# Patient Record
Sex: FEMALE | Race: WHITE | NOT HISPANIC OR LATINO
[De-identification: names, ages, dates, MRNs, and addresses within clinical notes are randomized per-mention and may not be internally consistent; named-entity substitution may affect disease eponyms.]

---

## 2018-01-22 ENCOUNTER — APPOINTMENT (OUTPATIENT)
Dept: CARDIOLOGY | Facility: CLINIC | Age: 68
End: 2018-01-22

## 2018-01-31 ENCOUNTER — TRANSCRIPTION ENCOUNTER (OUTPATIENT)
Age: 68
End: 2018-01-31

## 2018-04-20 ENCOUNTER — TRANSCRIPTION ENCOUNTER (OUTPATIENT)
Age: 68
End: 2018-04-20

## 2018-06-13 ENCOUNTER — TRANSCRIPTION ENCOUNTER (OUTPATIENT)
Age: 68
End: 2018-06-13

## 2018-06-15 ENCOUNTER — TRANSCRIPTION ENCOUNTER (OUTPATIENT)
Age: 68
End: 2018-06-15

## 2018-10-26 ENCOUNTER — APPOINTMENT (OUTPATIENT)
Dept: CARDIOLOGY | Facility: CLINIC | Age: 68
End: 2018-10-26
Payer: MEDICARE

## 2018-10-26 VITALS
RESPIRATION RATE: 14 BRPM | BODY MASS INDEX: 27.14 KG/M2 | HEIGHT: 64 IN | DIASTOLIC BLOOD PRESSURE: 80 MMHG | SYSTOLIC BLOOD PRESSURE: 126 MMHG | WEIGHT: 159 LBS | HEART RATE: 56 BPM

## 2018-10-26 DIAGNOSIS — Z78.9 OTHER SPECIFIED HEALTH STATUS: ICD-10-CM

## 2018-10-26 DIAGNOSIS — R07.89 OTHER CHEST PAIN: ICD-10-CM

## 2018-10-26 DIAGNOSIS — Z82.49 FAMILY HISTORY OF ISCHEMIC HEART DISEASE AND OTHER DISEASES OF THE CIRCULATORY SYSTEM: ICD-10-CM

## 2018-10-26 PROCEDURE — 99215 OFFICE O/P EST HI 40 MIN: CPT

## 2018-10-26 PROCEDURE — 93000 ELECTROCARDIOGRAM COMPLETE: CPT

## 2018-11-07 ENCOUNTER — APPOINTMENT (OUTPATIENT)
Dept: CARDIOLOGY | Facility: CLINIC | Age: 68
End: 2018-11-07
Payer: MEDICARE

## 2018-11-07 PROCEDURE — 93015 CV STRESS TEST SUPVJ I&R: CPT

## 2018-12-03 ENCOUNTER — APPOINTMENT (OUTPATIENT)
Dept: CARDIOLOGY | Facility: CLINIC | Age: 68
End: 2018-12-03
Payer: MEDICARE

## 2018-12-03 PROCEDURE — 93306 TTE W/DOPPLER COMPLETE: CPT

## 2019-01-10 ENCOUNTER — APPOINTMENT (OUTPATIENT)
Dept: CARDIOLOGY | Facility: CLINIC | Age: 69
End: 2019-01-10
Payer: MEDICARE

## 2019-01-10 VITALS
HEART RATE: 56 BPM | BODY MASS INDEX: 26.8 KG/M2 | RESPIRATION RATE: 14 BRPM | WEIGHT: 157 LBS | DIASTOLIC BLOOD PRESSURE: 80 MMHG | HEIGHT: 64 IN | SYSTOLIC BLOOD PRESSURE: 128 MMHG

## 2019-01-10 DIAGNOSIS — R07.89 OTHER CHEST PAIN: ICD-10-CM

## 2019-01-10 PROCEDURE — 99214 OFFICE O/P EST MOD 30 MIN: CPT

## 2019-01-10 PROCEDURE — 93000 ELECTROCARDIOGRAM COMPLETE: CPT

## 2019-01-11 NOTE — ASSESSMENT
[FreeTextEntry1] : 1.  EKG today reveals sinus bradycardia at 56 bpm.  Normal intervals.  No evidence of ischemia.  \par 2.  Hypertension:  Blood pressure under control at this time on current medications.  A low-salt diet is advised. \par 3.  Hyperlipidemia:  Recent bloodwork through her PCP’s office revealed a total cholesterol of approximately 260-270.  Will augment Crestor from 5 to 10 mg daily.  Fasting lipid profile in 6-8 weeks with an office visit thereafter. \par 4.  Chest pain:  Patient recently underwent both echocardiography and an exercise stress test.  Echo revealed normal left ventricular chamber dimensions and wall motion with preserved ejection fraction estimated at approximately 60-65%.  The left atrium and right-sided chambers revealed normal dimensions and function.  The aortic root revealed a normal diameter.  No significant valvular abnormalities, intracardiac masses, or pericardial effusions. \par 5.  Exercise stress testing was performed utilizing a Christiano protocol.  Mrs. Sharma exercised for approximately 7 minutes and obtained 92% of her predicted maximal heart rate.  At a peak heart rate of 142 bpm, there were no significant EKG changes noted to suggest ischemia.  The patient did not experience chest pain or arrhythmia with exercise.  \par \par Given the above, the patient is advised to follow up with her PCP regarding other potential etiologies for her presenting symptoms.  She does admit to left shoulder issue which may be at least in part her problem.  She is advised to exercise on a regular basis and in fact states she walks between 5 and 10 miles on a regular basis with her walking club.   \par

## 2019-01-11 NOTE — HISTORY OF PRESENT ILLNESS
[FreeTextEntry1] : At this time, the patient denies significant exertional chest pain, shortness of breath, palpitations, lightheadedness, or syncope.  She recently underwent bloodwork through her PCP’s office and states that her total cholesterol was approximately 260-270.

## 2019-01-11 NOTE — REASON FOR VISIT
[FreeTextEntry1] : Mrs. Sharma presents today for follow up evaluation of recent bouts of chest discomfort with associated left arm paresthesias.  She underwent an echocardiogram as well as an exercise stress test for evaluation of her complaints.  \par   \par

## 2019-03-28 ENCOUNTER — TRANSCRIPTION ENCOUNTER (OUTPATIENT)
Age: 69
End: 2019-03-28

## 2019-04-12 ENCOUNTER — APPOINTMENT (OUTPATIENT)
Dept: CARDIOLOGY | Facility: CLINIC | Age: 69
End: 2019-04-12
Payer: MEDICARE

## 2019-04-12 ENCOUNTER — NON-APPOINTMENT (OUTPATIENT)
Age: 69
End: 2019-04-12

## 2019-04-12 VITALS
BODY MASS INDEX: 26.98 KG/M2 | HEIGHT: 64 IN | SYSTOLIC BLOOD PRESSURE: 132 MMHG | WEIGHT: 158 LBS | DIASTOLIC BLOOD PRESSURE: 82 MMHG | HEART RATE: 59 BPM | RESPIRATION RATE: 14 BRPM

## 2019-04-12 PROCEDURE — 93000 ELECTROCARDIOGRAM COMPLETE: CPT

## 2019-04-12 PROCEDURE — 99214 OFFICE O/P EST MOD 30 MIN: CPT

## 2019-04-12 RX ORDER — LISINOPRIL 20 MG/1
20 TABLET ORAL DAILY
Qty: 90 | Refills: 0 | Status: DISCONTINUED | COMMUNITY
End: 2019-04-12

## 2019-04-19 NOTE — REASON FOR VISIT
[FreeTextEntry1] : Mrs. Sharma is a delightful 68-year-old white female with a past medical history significant for hypertension as well as hyperlipidemia who presents for follow up evaluation.

## 2019-04-19 NOTE — PHYSICAL EXAM
[General Appearance - Well Developed] : well developed [General Appearance - Well Nourished] : well nourished [General Appearance - In No Acute Distress] : no acute distress [Normal Conjunctiva] : the conjunctiva exhibited no abnormalities [Normal Oral Mucosa] : normal oral mucosa [Auscultation Breath Sounds / Voice Sounds] : lungs were clear to auscultation bilaterally [Heart Rate And Rhythm] : heart rate and rhythm were normal [Heart Sounds] : normal S1 and S2 [Abnormal Walk] : normal gait [Bowel Sounds] : normal bowel sounds [Skin Color & Pigmentation] : normal skin color and pigmentation [Oriented To Time, Place, And Person] : oriented to person, place, and time [Skin Turgor] : normal skin turgor [Affect] : the affect was normal [Mood] : the mood was normal [FreeTextEntry1] : No edema

## 2019-04-19 NOTE — HISTORY OF PRESENT ILLNESS
[FreeTextEntry1] : From a cardiac standpoint, Mrs. Sharma denies exertional chest pain, shortness of breath, palpitations, lightheadedness, or syncope.  She states that she has a daily cough which is precipitated by the ingestion of Lisinopril.

## 2019-04-19 NOTE — ASSESSMENT
[FreeTextEntry1] : 1.  EKG today demonstrates normal sinus rhythm at 59 bpm.  Normal intervals.  No evidence of ischemia.  \par 2.  Cough:  Suspect Lisinopril as a potential culprit.  Will discontinue Lisinopril at this time.  Amlodipine 5 mg daily recommended.  Patient advised on a low-salt diet.  Blood pressure check in approximately 6-8 weeks.\par 3.  Hypercholesterolemia:  Patient advised on a strict low-fat / low-cholesterol diet and periodic lipid profiles through her PCP’s office.     \par

## 2019-05-09 ENCOUNTER — APPOINTMENT (OUTPATIENT)
Dept: CARDIOLOGY | Facility: CLINIC | Age: 69
End: 2019-05-09

## 2019-07-09 ENCOUNTER — APPOINTMENT (OUTPATIENT)
Dept: CARDIOLOGY | Facility: CLINIC | Age: 69
End: 2019-07-09
Payer: MEDICARE

## 2019-07-09 ENCOUNTER — NON-APPOINTMENT (OUTPATIENT)
Age: 69
End: 2019-07-09

## 2019-07-09 VITALS
WEIGHT: 155 LBS | HEIGHT: 64 IN | DIASTOLIC BLOOD PRESSURE: 84 MMHG | RESPIRATION RATE: 14 BRPM | HEART RATE: 56 BPM | SYSTOLIC BLOOD PRESSURE: 142 MMHG | BODY MASS INDEX: 26.46 KG/M2

## 2019-07-09 DIAGNOSIS — R42 DIZZINESS AND GIDDINESS: ICD-10-CM

## 2019-07-09 PROCEDURE — 93000 ELECTROCARDIOGRAM COMPLETE: CPT

## 2019-07-09 PROCEDURE — 99215 OFFICE O/P EST HI 40 MIN: CPT

## 2019-07-09 RX ORDER — AMLODIPINE BESYLATE 5 MG/1
5 TABLET ORAL DAILY
Qty: 90 | Refills: 3 | Status: DISCONTINUED | COMMUNITY
Start: 2019-04-12 | End: 2019-07-09

## 2019-07-09 NOTE — PHYSICAL EXAM
[Normal Appearance] : normal appearance [General Appearance - Well Developed] : well developed [General Appearance - Well Nourished] : well nourished [Normal Conjunctiva] : the conjunctiva exhibited no abnormalities [Normal Oral Mucosa] : normal oral mucosa [General Appearance - In No Acute Distress] : no acute distress [Normal Oropharynx] : normal oropharynx [Normal Jugular Venous A Waves Present] : normal jugular venous A waves present [Normal Jugular Venous V Waves Present] : normal jugular venous V waves present [Respiration, Rhythm And Depth] : normal respiratory rhythm and effort [No Jugular Venous Champion A Waves] : no jugular venous champion A waves [] : no respiratory distress [Heart Sounds] : normal S1 and S2 [Auscultation Breath Sounds / Voice Sounds] : lungs were clear to auscultation bilaterally [Heart Rate And Rhythm] : heart rate and rhythm were normal [Murmurs] : no murmurs present [Bowel Sounds] : normal bowel sounds [Edema] : no peripheral edema present [Abnormal Walk] : normal gait [Nail Clubbing] : no clubbing of the fingernails [Cyanosis, Localized] : no localized cyanosis [Skin Color & Pigmentation] : normal skin color and pigmentation [Oriented To Time, Place, And Person] : oriented to person, place, and time [Impaired Insight] : insight and judgment were intact [Affect] : the affect was normal

## 2019-07-11 NOTE — HISTORY OF PRESENT ILLNESS
[FreeTextEntry1] : During the last couple of weeks prior to hospital visit, her blood pressures have been averaging in the 80s over 50s with heart rates in the 30s to 40s.  In addition, Mrs. Sharma was feeling lightheaded and dizzy almost daily since approximately June 3rd.  She subsequently stopped her Lisinopril 20 mg since being discharged from the hospital and she has felt much better, her dizziness has since resolved and her blood pressures / pulse rates have stabilized, now averaging BPs in the 120s to 130s over 80s with HRs in the 50s\par \par Patient is tolerating all other medications without negative side effects including Crestor 5 mg QHS and Synthroid 75 mcg QD.  She has since stopped taking Lisinopril 20 mg since July 4th.\par \par Most recent Exercise Stress test (November 2018):  Patient exercised 8 METS, negative EKG for ischemia.  Resting heart rate was (65 bpm) with peak heart rate of (142 bpm) reaching 92% of the maximum predicted heart rate;  (no signs of chronotropic incompetence).\par \par Most recent Transthoracic Echocardiogram (December 2018):  Normal LV function with an EF of 60 to 65%, left and right atria normal in size and structure, trace valvulopathy with mild MR, TR, AR and DC.

## 2019-07-11 NOTE — DISCUSSION/SUMMARY
[FreeTextEntry1] : 1).  Will complete a 30 day Event Monitor to assess for any tachy / tyler arrhythmias, pauses and/or AV blocks.\par \par 2).  Blood pressure and heart rate is currently controlled, will continue to hold Lisinopril 20 mg and take all other previously prescribed medications (refer above).  She is to continue to monitor BP at home and bring log and BP cuff to f/u for calibration. \par \par 3).  Follow up with PCP (Dr. Philippe) regarding routine checkups and blood work including hypothyroid management, have copy faxed to our office. \par \par 4).  Immediately go to the emergency department and/or report any untoward symptoms. \par \par 5).  Follow up with our office in 6 weeks or PRN.

## 2019-07-11 NOTE — ASSESSMENT
[FreeTextEntry1] : EKG 7/9/2019:  The EKG illustrates sinus bradycardia, rate of 56, early R wave transition V1 to V2.\par \par Most recent Blood work results from [facility] (July 4th 2019):   Hgb (12.9), Hct (37.9), Glucose (91), Creatinine (0.9), Sodium (141), Potassium (4.2), TSH (0.126), Magnesium (2.4).\par \par \par Mrs. Sharma admits to not walking through any wooded areas lately and/or noticing signs of ticks or unusual rashes.  \par \par Her Lisinopril 20 mg was replaced by Norvasc 5 mg daily on April 12th due to ACEI produced cough, she developed a constant headache and she switched herself back to Lisinopril 20 mg after approximately two weeks.\par \par

## 2019-08-20 ENCOUNTER — APPOINTMENT (OUTPATIENT)
Dept: CARDIOLOGY | Facility: CLINIC | Age: 69
End: 2019-08-20
Payer: MEDICARE

## 2019-08-20 ENCOUNTER — NON-APPOINTMENT (OUTPATIENT)
Age: 69
End: 2019-08-20

## 2019-08-20 VITALS
HEART RATE: 68 BPM | RESPIRATION RATE: 16 BRPM | HEIGHT: 64 IN | BODY MASS INDEX: 25.61 KG/M2 | SYSTOLIC BLOOD PRESSURE: 122 MMHG | WEIGHT: 150 LBS | OXYGEN SATURATION: 98 % | DIASTOLIC BLOOD PRESSURE: 80 MMHG

## 2019-08-20 PROCEDURE — 99214 OFFICE O/P EST MOD 30 MIN: CPT

## 2019-08-20 PROCEDURE — 93000 ELECTROCARDIOGRAM COMPLETE: CPT | Mod: 59

## 2019-08-20 NOTE — REASON FOR VISIT
[FreeTextEntry1] : Mrs. Sharma is a delightful 68-year-old white female with a past medical history significant for hypertension as well as hyperlipidemia who presents for follow up evaluation.  Mrs. Sharma is here to review the results of the 30 Day Event Monitor.  She denies any cardiac complaints such as CP, SOB, CARRILLO, PND, orthopnea, palpitations, presyncope, syncope, edema.

## 2019-08-20 NOTE — DISCUSSION/SUMMARY
[FreeTextEntry1] : 1).  Patient reassured the 30 day event monitor did not reveal any significant findings of pauses, heart block or arrhythmias.  The decreased heart rate while sleeping is a normal physiological event that is common with heart function; she is to take her Apple Watch off at night when going to bed so she is not alarmed of this.\par \par 2).  Patient was offered to switch her Lisinopril to an ARB which would most likely decrease the adverse effects of coughing. She adamantly declines at this time stating the cough does not bother her right now.\par \par 3).  Continue to eat a well balanced diet low in fat and low in carbohydrates.  She is to continue to implement an aerobic exercise regimen 4 to 5 days per week. \par \par 4).  Follow up with PCP regarding routine checkups and blood work (will refer to Dr. Mar), have copy faxed to our office. \par \par 5).  Recommend patient report any untoward symptoms. \par \par 6).  Follow up with our office in 1 year or PRN.

## 2019-08-20 NOTE — HISTORY OF PRESENT ILLNESS
[FreeTextEntry1] : She reports she has no longer been feeling the previous symptoms of dizziness and overall has been well without experiencing any significant adverse symptoms.  However, she has been quite anxious lately due to her apple watch notifying her of low pulse rates in the (30s) while she is sleeping.\par \par Her at-home blood pressures have been averaging in the 110s to 120s over 70s to 80s.  She has decided to once again begin taking Lisinopril 20 mg daily despite her history of slight cough in the morning.  This was due to her BP did elevate to the 150s over 90s.\par \par 30 Day Event Monitor (7/22 to 8/17/2019):  Average heart rate was (63 bpm) with a Max of 120 bpm and Min of 38 bpm (at 3:02 A.M.).  There were no significant findings such as atrial fibrillation, SVT, pauses, heart blocks and/or VT.  There was a less than 1% burden of PVCs and PACs noted. (patient had multiple events of heart rates recorded in the 30s while sleeping at night).

## 2019-08-20 NOTE — ASSESSMENT
[FreeTextEntry1] : EKG 8/20/2019:  The EKG illustrates sinus rhythm, rate of 68, early R wave transition V1 to V2.  Essentially unchanged.

## 2020-07-06 ENCOUNTER — APPOINTMENT (OUTPATIENT)
Dept: CARDIOLOGY | Facility: CLINIC | Age: 70
End: 2020-07-06

## 2020-07-17 ENCOUNTER — APPOINTMENT (OUTPATIENT)
Dept: CARDIOLOGY | Facility: CLINIC | Age: 70
End: 2020-07-17
Payer: MEDICARE

## 2020-07-17 ENCOUNTER — NON-APPOINTMENT (OUTPATIENT)
Age: 70
End: 2020-07-17

## 2020-07-17 VITALS
SYSTOLIC BLOOD PRESSURE: 127 MMHG | DIASTOLIC BLOOD PRESSURE: 70 MMHG | RESPIRATION RATE: 16 BRPM | WEIGHT: 144 LBS | HEART RATE: 65 BPM | BODY MASS INDEX: 24.59 KG/M2 | TEMPERATURE: 97.6 F | HEIGHT: 64 IN

## 2020-07-17 DIAGNOSIS — R05 COUGH: ICD-10-CM

## 2020-07-17 PROCEDURE — 93000 ELECTROCARDIOGRAM COMPLETE: CPT

## 2020-07-17 PROCEDURE — 99214 OFFICE O/P EST MOD 30 MIN: CPT

## 2020-07-17 NOTE — REASON FOR VISIT
[FreeTextEntry1] : The patient is a 69-year-old white female with a past medical history significant for hypertension and hyperlipidemia who presents for follow up evaluation.

## 2020-07-17 NOTE — PHYSICAL EXAM
[General Appearance - Well Developed] : well developed [General Appearance - In No Acute Distress] : no acute distress [Normal Conjunctiva] : the conjunctiva exhibited no abnormalities [Normal Oral Mucosa] : normal oral mucosa [] : no respiratory distress [Auscultation Breath Sounds / Voice Sounds] : lungs were clear to auscultation bilaterally [Heart Rate And Rhythm] : heart rate and rhythm were normal [Edema] : no peripheral edema present [Murmurs] : no murmurs present [Heart Sounds] : normal S1 and S2 [Bowel Sounds] : normal bowel sounds [FreeTextEntry1] : No edema [Abnormal Walk] : normal gait [Skin Turgor] : normal skin turgor [Skin Color & Pigmentation] : normal skin color and pigmentation [Affect] : the affect was normal [Oriented To Time, Place, And Person] : oriented to person, place, and time [Mood] : the mood was normal

## 2020-07-17 NOTE — HISTORY OF PRESENT ILLNESS
[FreeTextEntry1] : Mrs. Sharma presents today feeling well.  Denies complaints of chest pain, shortness of breath, palpitations, lightheadedness or syncope.  States that she has been under a lot of stress lately as her 35-year-old daughter was recently diagnosed with terminal cancer.  Admits that she has not been as compliant as she should be with her diet and low sodium intake.

## 2020-07-17 NOTE — DISCUSSION/SUMMARY
[FreeTextEntry1] : 1 - Hypertension:  blood pressure well controlled on current medications.  Advised to follow low sodium diet.\par \par 2 - Hyperlipidemia:  cholesterol 177, HDL 58, triglycerides 119, LDL 97, TC/HDL 3.1.  Follow low fat, low cholesterol diet.  Continue Rosuvastatin 5mg daily.  Repeat blood work prior to next visit.\par \par 3 - Labs (5/15/2020):  HgA1c 5.4,, vitamin D 42, TSH 0.42, H/H 13.5/40.9, platelets 259, glucose 107, BUN 16, creatinine 0.95, potassium 4.4.\par \par 4 - Follow up with Dr. Fitzgerald in 1 year.

## 2021-02-26 ENCOUNTER — APPOINTMENT (OUTPATIENT)
Dept: ORTHOPEDIC SURGERY | Facility: CLINIC | Age: 71
End: 2021-02-26

## 2021-05-04 ENCOUNTER — APPOINTMENT (OUTPATIENT)
Dept: CARDIOLOGY | Facility: CLINIC | Age: 71
End: 2021-05-04
Payer: MEDICARE

## 2021-05-04 VITALS
RESPIRATION RATE: 14 BRPM | TEMPERATURE: 98 F | DIASTOLIC BLOOD PRESSURE: 80 MMHG | SYSTOLIC BLOOD PRESSURE: 136 MMHG | WEIGHT: 153 LBS | HEIGHT: 64 IN | BODY MASS INDEX: 26.12 KG/M2 | HEART RATE: 60 BPM

## 2021-05-04 PROCEDURE — 99214 OFFICE O/P EST MOD 30 MIN: CPT

## 2021-05-04 PROCEDURE — 99072 ADDL SUPL MATRL&STAF TM PHE: CPT

## 2021-05-04 PROCEDURE — 93000 ELECTROCARDIOGRAM COMPLETE: CPT

## 2021-05-04 RX ORDER — ROSUVASTATIN CALCIUM 5 MG/1
5 TABLET, FILM COATED ORAL
Qty: 90 | Refills: 3 | Status: ACTIVE | COMMUNITY

## 2021-05-06 NOTE — REASON FOR VISIT
[FreeTextEntry1] : Mrs. Sharma is a pleasant 70-year-old white female with a past medical history significant for hypertension, as well as hyperlipidemia and mild valvular heart disease, who presents for follow up evaluation.   \par \par

## 2021-05-06 NOTE — HISTORY OF PRESENT ILLNESS
[FreeTextEntry1] : From a cardiac standpoint, Mrs. Sharma denies exertional chest pain, shortness of breath, or other cardiac symptoms.  She states that her blood pressure is “all over the place” as she is very anxious about her daughter’s health, whom was recently diagnosed with cancer.

## 2021-05-06 NOTE — ASSESSMENT
[FreeTextEntry1] : 1.  EKG today demonstrates normal sinus rhythm at 68 bpm.   Normal intervals.  No evidence of ischemia. \par \par 2.  Hypertension:  Blood pressure appears well controlled today, although, patient states that at home, her numbers are sporadic.  I suspect a lot of this anxiety and possibly panic disorder.  I have advised her to continue monitoring her pressures at home while following a low-salt diet.  She was also advised to speak to her PCP regarding antianxiety medication.  \par \par 3.  Hyperlipidemia:  Review of recent bloodwork reveals a total cholesterol of 164, HDL 74, TC/HDL ratio 2.2, LDL 69, triglycerides 103.  Patient advised on a low-fat / low-cholesterol diet as well as continuation of current Rosuvastatin therapy.  \par \par 4.  Valvular heart disease:  Patient with known history of mild mitral and tricuspid valvular regurgitation.  Will schedule her for a follow up echocardiogram prior to her next office visit in three months. \par

## 2021-06-03 ENCOUNTER — TRANSCRIPTION ENCOUNTER (OUTPATIENT)
Age: 71
End: 2021-06-03

## 2021-06-08 ENCOUNTER — APPOINTMENT (OUTPATIENT)
Dept: CARDIOLOGY | Facility: CLINIC | Age: 71
End: 2021-06-08
Payer: MEDICARE

## 2021-06-08 PROCEDURE — 93306 TTE W/DOPPLER COMPLETE: CPT

## 2021-07-13 ENCOUNTER — APPOINTMENT (OUTPATIENT)
Dept: CARDIOLOGY | Facility: CLINIC | Age: 71
End: 2021-07-13
Payer: MEDICARE

## 2021-07-13 VITALS
HEART RATE: 51 BPM | RESPIRATION RATE: 16 BRPM | WEIGHT: 156 LBS | BODY MASS INDEX: 26.63 KG/M2 | HEIGHT: 64 IN | SYSTOLIC BLOOD PRESSURE: 120 MMHG | DIASTOLIC BLOOD PRESSURE: 70 MMHG

## 2021-07-13 DIAGNOSIS — F32.9 MAJOR DEPRESSIVE DISORDER, SINGLE EPISODE, UNSPECIFIED: ICD-10-CM

## 2021-07-13 PROCEDURE — 99214 OFFICE O/P EST MOD 30 MIN: CPT

## 2021-07-13 PROCEDURE — 93000 ELECTROCARDIOGRAM COMPLETE: CPT

## 2021-07-15 NOTE — PHYSICAL EXAM
[General Appearance - Well Developed] : well developed [General Appearance - Well Nourished] : well nourished [General Appearance - In No Acute Distress] : no acute distress [Normal Conjunctiva] : the conjunctiva exhibited no abnormalities [Normal Oral Mucosa] : normal oral mucosa [Auscultation Breath Sounds / Voice Sounds] : lungs were clear to auscultation bilaterally [Heart Rate And Rhythm] : heart rate and rhythm were normal [Heart Sounds] : normal S1 and S2 [Bowel Sounds] : normal bowel sounds [Abnormal Walk] : normal gait [Skin Color & Pigmentation] : normal skin color and pigmentation [Skin Turgor] : normal skin turgor [Oriented To Time, Place, And Person] : oriented to person, place, and time [FreeTextEntry1] : Depressed

## 2021-07-15 NOTE — ASSESSMENT
[FreeTextEntry1] : 1.  EKG today reveals sinus bradycardia at 51 bpm.  Poor R-wave progression leads V1 through V3.  Non-specific ST-T changes.  \par \par 2.  Hypertension:  Blood pressure well controlled at this time on current medications.  Patient advised on a low-salt diet and weight loss.  \par \par 3.  Valvular heart disease:  Patient recently underwent echocardiography which revealed normal left ventricular chamber dimensions and wall motion with preserved ejection fraction estimated at approximately 60-65%.  The left atrium and right-sided chambers revealed normal dimensions and function.  The aortic root revealed a normal diameter.  Trace aortic valvular insufficiency is noted.  No other significant findings.  \par \par 4.  Clinical depression:  Patient appears to be very depressed at this time.  I suspect that this is multifactorial, but clearly secondary to her daughter’s diagnosis of malignancy.  Patient also states that her  is an “alcoholic.”  I have advised her to follow up with her PCP regarding further evaluation of her current depression.  From a cardiac standpoint, a low-fat / low-cholesterol diet and regular aerobic exercise were discussed.  If clinically stable, office visit 12 months.   \par

## 2021-07-15 NOTE — REASON FOR VISIT
[FreeTextEntry1] : Mrs. Sharma is a 70-year-old white female with a past medical history significant for hypertension, hyperlipidemia, mild valvular heart disease, and hypothyroidism, who presents for follow up evaluation.  \par \par

## 2021-07-15 NOTE — HISTORY OF PRESENT ILLNESS
[FreeTextEntry1] : From a cardiac standpoint, Mrs. Sharma denies exertional chest pain, shortness of breath, or other cardiac symptoms.  She states that she is very depressed about her daughter’s condition, whom was recently diagnosed with cancer.

## 2022-08-10 ENCOUNTER — APPOINTMENT (OUTPATIENT)
Dept: CARDIOLOGY | Facility: CLINIC | Age: 72
End: 2022-08-10

## 2022-08-10 VITALS
RESPIRATION RATE: 16 BRPM | DIASTOLIC BLOOD PRESSURE: 74 MMHG | HEIGHT: 64 IN | HEART RATE: 56 BPM | BODY MASS INDEX: 26.46 KG/M2 | SYSTOLIC BLOOD PRESSURE: 114 MMHG | WEIGHT: 155 LBS

## 2022-08-10 PROCEDURE — 93000 ELECTROCARDIOGRAM COMPLETE: CPT

## 2022-08-10 PROCEDURE — 99214 OFFICE O/P EST MOD 30 MIN: CPT

## 2022-08-10 RX ORDER — LORAZEPAM 2 MG/1
2 TABLET ORAL
Qty: 30 | Refills: 0 | Status: DISCONTINUED | COMMUNITY
End: 2022-08-10

## 2022-08-10 RX ORDER — GABAPENTIN 300 MG/1
300 CAPSULE ORAL
Qty: 90 | Refills: 0 | Status: DISCONTINUED | COMMUNITY
Start: 2022-05-09 | End: 2022-08-10

## 2022-08-10 RX ORDER — PANTOPRAZOLE 40 MG/1
40 TABLET, DELAYED RELEASE ORAL
Qty: 30 | Refills: 0 | Status: DISCONTINUED | COMMUNITY
Start: 2022-06-09

## 2022-08-10 RX ORDER — CYCLOBENZAPRINE HYDROCHLORIDE 10 MG/1
10 TABLET, FILM COATED ORAL
Qty: 10 | Refills: 0 | Status: DISCONTINUED | COMMUNITY
Start: 2022-07-01

## 2022-08-10 RX ORDER — ERYTHROMYCIN 5 MG/G
5 OINTMENT OPHTHALMIC
Qty: 4 | Refills: 0 | Status: DISCONTINUED | COMMUNITY
Start: 2022-04-25 | End: 2022-08-10

## 2022-08-10 RX ORDER — LORAZEPAM 1 MG/1
1 TABLET ORAL AT BEDTIME
Refills: 0 | Status: ACTIVE | COMMUNITY
Start: 2022-07-06

## 2022-08-10 RX ORDER — LEVOTHYROXINE SODIUM 75 UG/1
75 TABLET ORAL DAILY
Refills: 0 | Status: DISCONTINUED | COMMUNITY
End: 2022-08-10

## 2022-08-11 NOTE — ASSESSMENT
[FreeTextEntry1] : 1.  EKG today reveals sinus bradycardia at 56 bpm.  Normal intervals.  No evidence of ischemia.  \par \par 2.  Hypertension:  Blood pressure well controlled at this time on current medications.  A low-salt diet advised.  \par \par 3.  Hyperlipidemia:  No recent bloodwork for review.  Patient states that approximately two months ago, she underwent bloodwork through her PCP’s office and was told everything was “fine.”  I have advised her to undergo a strict low-fat / low-cholesterol diet and undergo follow up bloodwork prior to her next office visit.  \par \par 4.  Valvular heart disease:  Patient without chest pain, shortness of breath, or other symptoms.  Walking five miles a day.  If clinically stable, office visit one year.

## 2022-08-11 NOTE — REASON FOR VISIT
[FreeTextEntry1] : Mrs. Sharma is a pleasant 71-year-old white female with a past medical history significant for hypertension, hyperlipidemia, hypothyroidism, and mild valvular heart disease, who presents for follow up. \par \par

## 2022-08-11 NOTE — HISTORY OF PRESENT ILLNESS
[FreeTextEntry1] : From a cardiac standpoint, Mrs. Jimenez feels well denying exertional chest pain, shortness of breath, palpitations, lightheadedness, and syncope.  She states she is walking five miles per day. \par \par   \par

## 2022-10-26 ENCOUNTER — APPOINTMENT (OUTPATIENT)
Dept: ORTHOPEDIC SURGERY | Facility: CLINIC | Age: 72
End: 2022-10-26

## 2022-10-26 VITALS
HEIGHT: 64 IN | DIASTOLIC BLOOD PRESSURE: 78 MMHG | SYSTOLIC BLOOD PRESSURE: 140 MMHG | BODY MASS INDEX: 25.61 KG/M2 | HEART RATE: 50 BPM | WEIGHT: 150 LBS

## 2022-10-26 DIAGNOSIS — M25.562 PAIN IN LEFT KNEE: ICD-10-CM

## 2022-10-26 DIAGNOSIS — M17.12 UNILATERAL PRIMARY OSTEOARTHRITIS, LEFT KNEE: ICD-10-CM

## 2022-10-26 PROCEDURE — 73564 X-RAY EXAM KNEE 4 OR MORE: CPT | Mod: LT

## 2022-10-26 PROCEDURE — 20610 DRAIN/INJ JOINT/BURSA W/O US: CPT | Mod: LT

## 2022-10-26 PROCEDURE — 99204 OFFICE O/P NEW MOD 45 MIN: CPT | Mod: 25

## 2022-10-26 NOTE — PROCEDURE
[de-identified] : I injected the Left Knee.\par I discussed at length with the patient the planned steroid and lidocaine injection. The risks, benefits, convalescence and alternatives were reviewed. The possible side effects discussed included but were not limited to: pain, swelling, heat, bleeding, and redness. Symptoms are generally mild but if they are extensive then contact the office. Giving pain relievers by mouth such as NSAIDs or Tylenol can generally treat the reactions to steroid and lidocaine. Rare cases of infection have been noted. Rash, hives and itching may occur post injection. If you have muscle pain or cramps, flushing and or swelling of the face, rapid heart beat, nausea, dizziness, fever, chills, headache, difficulty breathing, swelling in the arms or legs, or have a prickly feeling of your skin, contact a health care provider immediately. Following this discussion, the knee was prepped with Alcohol and under sterile condition the 80 mg Depo-Medrol and 6 cc Lidocaine injection was performed with a 20 gauge needle through a superolateral injection site. The needle was introduced into the joint, aspiration was performed to ensure intra-articular placement and the medication was injected. Upon withdrawal of the needle the site was cleaned with alcohol and a band aid applied. The patient tolerated the injection well and there were no adverse effects. Post injection instructions included no strenuous activity for 24 hours, cryotherapy and if there are any adverse effects to contact the office.

## 2022-10-26 NOTE — PHYSICAL EXAM
[de-identified] : GENERAL APPEARANCE: Well nourished and hydrated, pleasant, alert, and oriented x 3. Appears their stated age. \par HEENT: Normocephalic, extraocular eye motion intact. Nasal septum midline. Oral cavity clear. External auditory canal clear. \par RESPIRATORY: Breath sounds clear and audible in all lobes. No wheezing, No accessory muscle use.\par CARDIOVASCULAR: No apparent abnormalities. No lower leg edema. No varicosities. Pedal pulses are palpable.\par NEUROLOGIC: Sensation is normal, no muscle weakness in the upper or lower extremities.\par DERMATOLOGIC: No apparent skin lesions, moist, warm, no rash.\par SPINE: Cervical spine appears normal and moves freely; thoracic spine appears normal and moves freely; lumbosacral spine appears normal and moves freely, normal, nontender.\par MUSCULOSKELETAL: Hands, wrists, and elbows are normal and move freely, shoulders are normal and move freely. \par Psychiatric: Oriented to person, place, and time, insight and judgement were intact and the affect was normal. \par Musculoskeletal:. Left knee exam shows mild effusion, ROM is 0-1 30 degrees, no instability, no pain with [place], medial joint line tenderness.  There is crepitus with range of motion in the patellofemoral joint, there is mild tenderness palpation posteriorly no cyst palpated\par 5/5 motor strength in bilateral lower extremities. Sensory: Intact in bilateral lower extremities. DTRs: Biceps, brachioradialis, triceps, patellar, ankle and plantar 2+ and symmetric bilaterally. Pulses: dorsalis pedis, posterior tibial, femoral, popliteal, and radial 2+ and symmetric bilaterally. \par Constitutional: Alert and in no acute distress, but well-appearing.  [de-identified] : 4 views of the left knee obtained the office today show no acute fracture or dislocation.  There is mild medial joint space narrowing patellofemoral arthritis small osteophyte formation consistent with Kellgren-Alvaro grade 2 changes.\par \par MRI of the left knee dated 10/19/2022 shows no meniscus tear.  Advanced patellofemoral arthritis.  Joint effusion and popliteal cyst both of which have increased in size compared to the prior study.

## 2022-10-26 NOTE — CONSULT LETTER
[Dear  ___] : Dear  [unfilled], [Consult Letter:] : I had the pleasure of evaluating your patient, [unfilled]. [( Thank you for referring [unfilled] for consultation for _____ )] : Thank you for referring [unfilled] for consultation for [unfilled] [Please see my note below.] : Please see my note below. [Consult Closing:] : Thank you very much for allowing me to participate in the care of this patient.  If you have any questions, please do not hesitate to contact me. [Sincerely,] : Sincerely, [FreeTextEntry2] : JILLIAN BEAL\par  [FreeTextEntry3] : Mingo Navarrete MD\par Orthopaedic Surgery\par Primary/Revision Hip & Knee Orthoplasty\par [facility]\par \par [facility] \par [address] \par [address] \[place] [address]\par \par Tel [phone]\par Fax [phone]\par \par For same day and next day orthopedic appointments contact:\par [email or web address] |[phone])\par Appointments available nights and weekends!  \par \par [facility]\par Visit us at [facility]/orthopaedic\par

## 2022-10-26 NOTE — HISTORY OF PRESENT ILLNESS
[Worsening] : worsening [5] : a current pain level of 5/10 [4] : an average pain level of 4/10 [Daily] : ~He/She~ states the symptoms seem to be occuring daily [Direct Pressure] : worsened by direct pressure [Walking] : worsened by walking [Knee Flexion] : worsened with knee flexion [de-identified] : 72-year-old female with past medical history of hypertension, neuropathy of bilateral lower extremity,  hyperlipidemia presents to the office for initial evaluation of left knee pain.  Dates pain started 3 weeks ago when she was moving from her house into an apartment, she felt a pop in her knee and had pain and swelling immediately after.  She went to her PCP who ordered an MRI which shows a joint effusion and burst popliteal cyst.  The patient states that since that day she has had pain specifically with ascending/descending stairs, rising from a seated position, walking.  She has been taking Tylenol for the pain with little relief.  Has not been to physical therapy denies a history of injections or surgeries in the knee.  Denies use of assistive device for ambulation.  She states that she usually walks 5 miles per day and has not been able to do so and would like to get back to that.  Denies any locking or catching in knee, radiation of pain to the hip or back. \par \par Denies a history of smoking, drug or alcohol misuse. [Hip Movement] : not worsened by hip movement [Acetaminophen] : not relieved by acetaminophen

## 2022-10-26 NOTE — DISCUSSION/SUMMARY
[Medication Risks Reviewed] : Medication risks reviewed [de-identified] : Patient is a 72-year-old female with left knee osteoarthritis Baker's cyst presenting today for initial evaluation.  I do think that the pain she is experiencing is coming from the arthritic flare as well as the small burst in her popliteal cyst.  I therefore recommended conservative treatment at this time.  I given a prescription for physical therapy.  I recommended meloxicam 15 mg daily as needed for pain.  I gave her injection of cortisone which she tolerated well.  I like to see her back in 2 months for repeat evaluation.  All questions were asked and answered

## 2023-01-25 ENCOUNTER — APPOINTMENT (OUTPATIENT)
Dept: CARDIOLOGY | Facility: CLINIC | Age: 73
End: 2023-01-25
Payer: MEDICARE

## 2023-01-25 VITALS
SYSTOLIC BLOOD PRESSURE: 110 MMHG | HEART RATE: 50 BPM | BODY MASS INDEX: 23.56 KG/M2 | DIASTOLIC BLOOD PRESSURE: 60 MMHG | WEIGHT: 138 LBS | RESPIRATION RATE: 16 BRPM | HEIGHT: 64 IN

## 2023-01-25 PROCEDURE — 93000 ELECTROCARDIOGRAM COMPLETE: CPT

## 2023-01-25 PROCEDURE — 99214 OFFICE O/P EST MOD 30 MIN: CPT

## 2023-01-25 RX ORDER — LISINOPRIL 20 MG/1
20 TABLET ORAL TWICE DAILY
Qty: 180 | Refills: 0 | Status: ACTIVE | COMMUNITY
Start: 2019-08-20

## 2023-01-31 NOTE — REASON FOR VISIT
[FreeTextEntry1] : Mrs. Sharma is a pleasant 72-year-old white female with a past medical history significant for hypertension, hyperlipidemia, hypothyroidism, and mild valvular heart disease, who presents for follow up. \par \par

## 2023-01-31 NOTE — ASSESSMENT
[FreeTextEntry1] : 1.  EKG today reveals sinus bradycardia at 50 bpm.  Normal intervals.  No evidence of ischemia. \par \par 2.  Hypertension:  Blood pressure appears to be well controlled here today.  A low-salt diet is advised. \par \par 3.  Hyperlipidemia:  Review of recent bloodwork performed through her PCP’s office reveals a total cholesterol of 155, HDL 62, TC/HDL ratio 2.5, LDL 76, triglycerides 83.  Patient advised to continue current low-dose statin therapy as well as follow a strict low-fat / low-cholesterol diet.  \par \par 4.  Valvular heart disease:  Patient without significant symptoms at this time.  Patient will undergo follow up echocardiography prior to her next office visit.  Advised to walk as much as possible.   \par   \par

## 2023-02-02 ENCOUNTER — APPOINTMENT (OUTPATIENT)
Dept: DERMATOLOGY | Facility: CLINIC | Age: 73
End: 2023-02-02
Payer: MEDICARE

## 2023-02-02 PROCEDURE — 99203 OFFICE O/P NEW LOW 30 MIN: CPT

## 2023-02-02 PROCEDURE — D0051: CPT

## 2023-02-10 ENCOUNTER — APPOINTMENT (OUTPATIENT)
Dept: CARDIOLOGY | Facility: CLINIC | Age: 73
End: 2023-02-10
Payer: MEDICARE

## 2023-02-10 PROCEDURE — 93306 TTE W/DOPPLER COMPLETE: CPT

## 2023-03-01 ENCOUNTER — APPOINTMENT (OUTPATIENT)
Dept: CARDIOLOGY | Facility: CLINIC | Age: 73
End: 2023-03-01
Payer: MEDICARE

## 2023-03-01 VITALS
DIASTOLIC BLOOD PRESSURE: 79 MMHG | HEIGHT: 64 IN | SYSTOLIC BLOOD PRESSURE: 127 MMHG | WEIGHT: 127 LBS | OXYGEN SATURATION: 100 % | HEART RATE: 60 BPM | BODY MASS INDEX: 21.68 KG/M2 | RESPIRATION RATE: 16 BRPM

## 2023-03-01 DIAGNOSIS — I10 ESSENTIAL (PRIMARY) HYPERTENSION: ICD-10-CM

## 2023-03-01 DIAGNOSIS — E78.5 HYPERLIPIDEMIA, UNSPECIFIED: ICD-10-CM

## 2023-03-01 DIAGNOSIS — R00.1 BRADYCARDIA, UNSPECIFIED: ICD-10-CM

## 2023-03-01 DIAGNOSIS — I38 ENDOCARDITIS, VALVE UNSPECIFIED: ICD-10-CM

## 2023-03-01 PROCEDURE — 99214 OFFICE O/P EST MOD 30 MIN: CPT

## 2023-03-01 RX ORDER — MELOXICAM 15 MG/1
15 TABLET ORAL
Qty: 30 | Refills: 0 | Status: DISCONTINUED | COMMUNITY
Start: 2022-10-26 | End: 2023-03-01

## 2023-03-01 RX ORDER — LEVOTHYROXINE SODIUM 75 UG/1
75 TABLET ORAL DAILY
Refills: 0 | Status: ACTIVE | COMMUNITY
Start: 2022-06-08

## 2023-03-01 NOTE — HISTORY OF PRESENT ILLNESS
[FreeTextEntry1] : Mrs. Sharma presents today without complaints of exertional chest pain, shortness of breath, palpitations, lightheadedness or syncope.  She continues to lose weight and recently underwent a tummy tuck.  States that her blood pressure has been much better controlled.

## 2023-03-01 NOTE — PHYSICAL EXAM
[Well Developed] : well developed [Well Nourished] : well nourished [No Acute Distress] : no acute distress [Normal Conjunctiva] : normal conjunctiva [Normal Venous Pressure] : normal venous pressure [No Carotid Bruit] : no carotid bruit [Normal S1, S2] : normal S1, S2 [No Murmur] : no murmur [No Rub] : no rub [S4] : S4 [Clear Lung Fields] : clear lung fields [No Respiratory Distress] : no respiratory distress  [Soft] : abdomen soft [Non Tender] : non-tender [No Masses/organomegaly] : no masses/organomegaly [Normal Bowel Sounds] : normal bowel sounds [Normal Gait] : normal gait [No Edema] : no edema [No Rash] : no rash [No Skin Lesions] : no skin lesions [Moves all extremities] : moves all extremities [No Focal Deficits] : no focal deficits [Normal Speech] : normal speech [Alert and Oriented] : alert and oriented [Normal memory] : normal memory [de-identified] : S/P abdominoplasty, one drain and wearing binder

## 2023-03-01 NOTE — DISCUSSION/SUMMARY
[FreeTextEntry1] : 1 - Hypertension:  blood pressure well controlled on current medications.  Advised to follow low sodium diet.\par \par 2 - Hyperlipidemia:  will continue with Rosuvastatin 5mg daily.  Follow low fat, low cholesterol diet.  Will have fasting blood work through PCPs office in the next few weeks.  Will have results forwarded to our office when available.\par \par 3 - Valvular heart disease:  recently underwent follow up echocardiogram.\par \par Echocardiogram (2/10/2023):  EF 60-65%.. Normal global left ventricular systolic function.  The left and right atria are normal in size and structure.  Normal right ventricular size ans systolic function.  Mild aortic vavle sclerosis without stenosis.  Trace aortic regurgitation. Mild mitral annular calcification.  Mild tricuspid regurgitation.  Mildly elevated pulmonary artery systolic pressure.\par \par Patient advised to follow healthy diet and increase physical activity, walking.\par \par 4 - Follow up with Dr. Fitzgerald in 4 months.

## 2023-05-25 ENCOUNTER — NON-APPOINTMENT (OUTPATIENT)
Age: 73
End: 2023-05-25

## 2023-07-07 ENCOUNTER — APPOINTMENT (OUTPATIENT)
Dept: CARDIOLOGY | Facility: CLINIC | Age: 73
End: 2023-07-07

## 2023-09-27 ENCOUNTER — OFFICE (OUTPATIENT)
Dept: URBAN - METROPOLITAN AREA CLINIC 104 | Facility: CLINIC | Age: 73
Setting detail: OPHTHALMOLOGY
End: 2023-09-27
Payer: MEDICARE

## 2023-09-27 DIAGNOSIS — H52.223: ICD-10-CM

## 2023-09-27 DIAGNOSIS — H25.13: ICD-10-CM

## 2023-09-27 DIAGNOSIS — H43.391: ICD-10-CM

## 2023-09-27 DIAGNOSIS — H18.413: ICD-10-CM

## 2023-09-27 DIAGNOSIS — H02.835: ICD-10-CM

## 2023-09-27 DIAGNOSIS — H00.14: ICD-10-CM

## 2023-09-27 DIAGNOSIS — H16.221: ICD-10-CM

## 2023-09-27 DIAGNOSIS — H02.832: ICD-10-CM

## 2023-09-27 PROCEDURE — 92014 COMPRE OPH EXAM EST PT 1/>: CPT | Performed by: SPECIALIST

## 2023-09-27 ASSESSMENT — REFRACTION_AUTOREFRACTION
OS_SPHERE: +0.50
OD_AXIS: 098
OS_AXIS: 062
OS_CYLINDER: -1.00
OD_CYLINDER: -1.25
OD_SPHERE: +0.75

## 2023-09-27 ASSESSMENT — DRY EYES - PHYSICIAN NOTES: OD_GENERALCOMMENTS: CENTRALLY

## 2023-09-27 ASSESSMENT — VISUAL ACUITY
OS_BCVA: 20/40-2
OD_BCVA: 20/30-2

## 2023-09-27 ASSESSMENT — SPHEQUIV_DERIVED
OD_SPHEQUIV: 0.125
OS_SPHEQUIV: 0

## 2023-09-27 ASSESSMENT — SUPERFICIAL PUNCTATE KERATITIS (SPK): OD_SPK: 1+

## 2023-09-27 ASSESSMENT — LID POSITION - DERMATOCHALASIS
OS_DERMATOCHALASIS: 2+
OD_DERMATOCHALASIS: 2+

## 2023-09-27 ASSESSMENT — TONOMETRY
OS_IOP_MMHG: 14
OD_IOP_MMHG: 14

## 2023-09-27 ASSESSMENT — CONFRONTATIONAL VISUAL FIELD TEST (CVF)
OD_FINDINGS: FULL
OS_FINDINGS: FULL

## 2023-09-27 ASSESSMENT — REFRACTION_CURRENTRX
OD_OVR_VA: 20/
OS_OVR_VA: 20/

## 2023-10-22 NOTE — PHYSICAL EXAM
[General Appearance - Well Developed] : well developed [Normal Appearance] : normal appearance [General Appearance - Well Nourished] : well nourished [General Appearance - In No Acute Distress] : no acute distress [Normal Conjunctiva] : the conjunctiva exhibited no abnormalities [Normal Oral Mucosa] : normal oral mucosa [Normal Oropharynx] : normal oropharynx [Normal Jugular Venous A Waves Present] : normal jugular venous A waves present [No Jugular Venous Champion A Waves] : no jugular venous champion A waves [Normal Jugular Venous V Waves Present] : normal jugular venous V waves present [] : no respiratory distress [Respiration, Rhythm And Depth] : normal respiratory rhythm and effort [Auscultation Breath Sounds / Voice Sounds] : lungs were clear to auscultation bilaterally [Heart Rate And Rhythm] : heart rate and rhythm were normal [Heart Sounds] : normal S1 and S2 [Murmurs] : no murmurs present [Bowel Sounds] : normal bowel sounds [Edema] : no peripheral edema present none [Abnormal Walk] : normal gait [Nail Clubbing] : no clubbing of the fingernails [Cyanosis, Localized] : no localized cyanosis [Skin Color & Pigmentation] : normal skin color and pigmentation [Impaired Insight] : insight and judgment were intact [Oriented To Time, Place, And Person] : oriented to person, place, and time [Affect] : the affect was normal

## 2023-11-28 ENCOUNTER — OFFICE (OUTPATIENT)
Dept: URBAN - METROPOLITAN AREA CLINIC 63 | Facility: CLINIC | Age: 73
Setting detail: OPHTHALMOLOGY
End: 2023-11-28
Payer: MEDICARE

## 2023-11-28 DIAGNOSIS — H00.14: ICD-10-CM

## 2023-11-28 DIAGNOSIS — H00.12: ICD-10-CM

## 2023-11-28 PROBLEM — H02.83 DERMATOCHALASIS; RIGHT LOWER LID, LEFT LOWER LID: Status: ACTIVE | Noted: 2023-11-28

## 2023-11-28 PROCEDURE — 92012 INTRM OPH EXAM EST PATIENT: CPT | Performed by: STUDENT IN AN ORGANIZED HEALTH CARE EDUCATION/TRAINING PROGRAM

## 2023-11-28 ASSESSMENT — LID POSITION - DERMATOCHALASIS
OS_DERMATOCHALASIS: 2+
OD_DERMATOCHALASIS: 2+

## 2023-11-28 ASSESSMENT — REFRACTION_CURRENTRX
OS_OVR_VA: 20/
OD_OVR_VA: 20/

## 2023-11-28 ASSESSMENT — REFRACTION_AUTOREFRACTION
OS_AXIS: 065
OD_CYLINDER: -1.00
OS_SPHERE: +0.25
OD_SPHERE: +0.50
OS_CYLINDER: -1.00
OD_AXIS: 098

## 2023-11-28 ASSESSMENT — SPHEQUIV_DERIVED
OD_SPHEQUIV: 0
OS_SPHEQUIV: -0.25

## 2023-11-28 ASSESSMENT — DRY EYES - PHYSICIAN NOTES: OD_GENERALCOMMENTS: CENTRALLY

## 2023-11-28 ASSESSMENT — SUPERFICIAL PUNCTATE KERATITIS (SPK): OD_SPK: 1+

## 2023-12-13 ENCOUNTER — OFFICE (OUTPATIENT)
Dept: URBAN - METROPOLITAN AREA CLINIC 104 | Facility: CLINIC | Age: 73
Setting detail: OPHTHALMOLOGY
End: 2023-12-13
Payer: MEDICARE

## 2023-12-13 DIAGNOSIS — H00.12: ICD-10-CM

## 2023-12-13 DIAGNOSIS — H00.14: ICD-10-CM

## 2023-12-13 PROCEDURE — 99213 OFFICE O/P EST LOW 20 MIN: CPT | Performed by: OPHTHALMOLOGY

## 2023-12-13 PROCEDURE — SCCOS COSMETIC CONSULTATION: Performed by: OPHTHALMOLOGY

## 2023-12-13 ASSESSMENT — REFRACTION_AUTOREFRACTION
OS_SPHERE: +0.25
OS_AXIS: 065
OD_CYLINDER: -1.00
OD_SPHERE: +0.50
OS_CYLINDER: -1.00
OD_AXIS: 098

## 2023-12-13 ASSESSMENT — SPHEQUIV_DERIVED
OD_SPHEQUIV: 0
OS_SPHEQUIV: -0.25

## 2023-12-13 ASSESSMENT — CONFRONTATIONAL VISUAL FIELD TEST (CVF)
OD_FINDINGS: FULL
OS_FINDINGS: FULL

## 2023-12-13 ASSESSMENT — REFRACTION_CURRENTRX
OS_OVR_VA: 20/
OD_OVR_VA: 20/

## 2023-12-13 ASSESSMENT — SUPERFICIAL PUNCTATE KERATITIS (SPK): OD_SPK: 1+

## 2023-12-13 ASSESSMENT — LID POSITION - DERMATOCHALASIS
OS_DERMATOCHALASIS: 2+
OD_DERMATOCHALASIS: 2+

## 2023-12-13 ASSESSMENT — DRY EYES - PHYSICIAN NOTES: OD_GENERALCOMMENTS: CENTRALLY

## 2024-10-28 ENCOUNTER — OFFICE (OUTPATIENT)
Dept: URBAN - METROPOLITAN AREA CLINIC 12 | Facility: CLINIC | Age: 74
Setting detail: OPHTHALMOLOGY
End: 2024-10-28
Payer: MEDICARE

## 2024-10-28 DIAGNOSIS — H43.391: ICD-10-CM

## 2024-10-28 DIAGNOSIS — H25.13: ICD-10-CM

## 2024-10-28 DIAGNOSIS — H16.221: ICD-10-CM

## 2024-10-28 PROBLEM — H02.835 DERMATOCHALASIS; RIGHT LOWER LID, LEFT LOWER LID: Status: ACTIVE | Noted: 2024-10-28

## 2024-10-28 PROBLEM — H02.832 DERMATOCHALASIS; RIGHT LOWER LID, LEFT LOWER LID: Status: ACTIVE | Noted: 2024-10-28

## 2024-10-28 PROCEDURE — 99213 OFFICE O/P EST LOW 20 MIN: CPT | Performed by: STUDENT IN AN ORGANIZED HEALTH CARE EDUCATION/TRAINING PROGRAM

## 2024-10-28 ASSESSMENT — CONFRONTATIONAL VISUAL FIELD TEST (CVF)
OS_FINDINGS: FULL
OD_FINDINGS: FULL

## 2024-10-28 ASSESSMENT — DRY EYES - PHYSICIAN NOTES: OD_GENERALCOMMENTS: CENTRALLY

## 2024-10-28 ASSESSMENT — LID POSITION - DERMATOCHALASIS
OS_DERMATOCHALASIS: 2+
OD_DERMATOCHALASIS: 2+

## 2024-10-28 ASSESSMENT — TONOMETRY
OD_IOP_MMHG: 17
OS_IOP_MMHG: 15

## 2024-10-28 ASSESSMENT — SUPERFICIAL PUNCTATE KERATITIS (SPK): OD_SPK: 1+

## 2024-10-30 ENCOUNTER — RX ONLY (RX ONLY)
Age: 74
End: 2024-10-30

## 2024-10-30 ASSESSMENT — REFRACTION_CURRENTRX
OD_VPRISM_DIRECTION: SV
OD_AXIS: 97
OD_SPHERE: +0.50
OD_OVR_VA: 20/
OS_SPHERE: PLANO
OS_CYLINDER: -1.25
OD_CYLINDER: -1.00
OS_AXIS: 67
OS_VPRISM_DIRECTION: SV
OS_OVR_VA: 20/

## 2024-10-30 ASSESSMENT — KERATOMETRY
OD_AXISANGLE_DEGREES: 38
OD_K2POWER_DIOPTERS: 47.00
OD_K1POWER_DIOPTERS: 46.00
OS_K2POWER_DIOPTERS: 46.00
OS_K1POWER_DIOPTERS: 45.25
OS_AXISANGLE_DEGREES: 94

## 2024-10-30 ASSESSMENT — REFRACTION_MANIFEST
OS_SPHERE: +2.00
OD_SPHERE: +1.00
OD_VA1: 20/NI
OS_CYLINDER: -0.50
OS_VA1: 20/NI
OS_AXIS: 78
OD_CYLINDER: -1.75
OD_AXIS: 105

## 2024-10-30 ASSESSMENT — VISUAL ACUITY
OD_BCVA: 20/40-2
OS_BCVA: 20/25

## 2024-10-30 ASSESSMENT — REFRACTION_AUTOREFRACTION
OD_SPHERE: +1.00
OS_AXIS: 78
OS_SPHERE: +2.00
OD_AXIS: 104
OS_CYLINDER: -0.50
OD_CYLINDER: -1.75

## 2025-01-29 ENCOUNTER — OFFICE (OUTPATIENT)
Dept: URBAN - METROPOLITAN AREA CLINIC 104 | Facility: CLINIC | Age: 75
Setting detail: OPHTHALMOLOGY
End: 2025-01-29
Payer: MEDICARE

## 2025-01-29 DIAGNOSIS — H00.15: ICD-10-CM

## 2025-01-29 PROCEDURE — 92012 INTRM OPH EXAM EST PATIENT: CPT | Performed by: OPTOMETRIST

## 2025-01-29 ASSESSMENT — REFRACTION_CURRENTRX
OD_AXIS: 97
OD_OVR_VA: 20/
OS_AXIS: 67
OD_VPRISM_DIRECTION: SV
OS_VPRISM_DIRECTION: SV
OS_SPHERE: PLANO
OD_SPHERE: +0.50
OD_CYLINDER: -1.00
OS_OVR_VA: 20/
OS_CYLINDER: -1.25

## 2025-01-29 ASSESSMENT — REFRACTION_MANIFEST
OD_AXIS: 105
OS_AXIS: 78
OS_SPHERE: +2.00
OD_CYLINDER: -1.75
OS_CYLINDER: -0.50
OS_VA1: 20/NI
OD_VA1: 20/NI
OD_SPHERE: +1.00

## 2025-01-29 ASSESSMENT — KERATOMETRY
OS_AXISANGLE_DEGREES: 128
OD_K2POWER_DIOPTERS: 46.36
OD_K1POWER_DIOPTERS: 45.79
OS_K2POWER_DIOPTERS: 46.42
OD_AXISANGLE_DEGREES: 018
OS_K1POWER_DIOPTERS: 45.79

## 2025-01-29 ASSESSMENT — REFRACTION_AUTOREFRACTION
OD_AXIS: 102
OD_SPHERE: +1.00
OS_CYLINDER: -0.75
OD_CYLINDER: -1.50
OS_AXIS: 070
OS_SPHERE: +0.75

## 2025-01-29 ASSESSMENT — SUPERFICIAL PUNCTATE KERATITIS (SPK): OD_SPK: 1+

## 2025-01-29 ASSESSMENT — VISUAL ACUITY
OS_BCVA: 20/30-2
OD_BCVA: 20/20

## 2025-01-29 ASSESSMENT — CONFRONTATIONAL VISUAL FIELD TEST (CVF)
OD_FINDINGS: FULL
OS_FINDINGS: FULL

## 2025-01-29 ASSESSMENT — LID POSITION - DERMATOCHALASIS
OD_DERMATOCHALASIS: 2+
OS_DERMATOCHALASIS: 2+

## 2025-01-29 ASSESSMENT — DRY EYES - PHYSICIAN NOTES: OD_GENERALCOMMENTS: CENTRALLY

## 2025-02-19 ENCOUNTER — OFFICE (OUTPATIENT)
Dept: URBAN - METROPOLITAN AREA CLINIC 104 | Facility: CLINIC | Age: 75
Setting detail: OPHTHALMOLOGY
End: 2025-02-19
Payer: MEDICARE

## 2025-02-19 DIAGNOSIS — H00.15: ICD-10-CM

## 2025-02-19 PROBLEM — H02.83 DERMATOCHALASIS: Status: ACTIVE | Noted: 2025-02-19

## 2025-02-19 PROCEDURE — 99213 OFFICE O/P EST LOW 20 MIN: CPT | Performed by: OPTOMETRIST

## 2025-02-19 ASSESSMENT — CONFRONTATIONAL VISUAL FIELD TEST (CVF)
OD_FINDINGS: FULL
OS_FINDINGS: FULL

## 2025-02-19 ASSESSMENT — LID POSITION - DERMATOCHALASIS
OD_DERMATOCHALASIS: 2+
OS_DERMATOCHALASIS: 2+

## 2025-02-19 ASSESSMENT — REFRACTION_AUTOREFRACTION
OS_SPHERE: +0.75
OD_SPHERE: +1.00
OD_AXIS: 102
OD_CYLINDER: -1.50
OS_AXIS: 070
OS_CYLINDER: -0.75

## 2025-02-19 ASSESSMENT — VISUAL ACUITY
OD_BCVA: 20/20
OS_BCVA: 20/30-2

## 2025-02-19 ASSESSMENT — REFRACTION_CURRENTRX
OS_OVR_VA: 20/
OS_VPRISM_DIRECTION: SV
OS_SPHERE: PLANO
OD_CYLINDER: -1.00
OS_CYLINDER: -1.25
OS_AXIS: 67
OD_VPRISM_DIRECTION: SV
OD_SPHERE: +0.50
OD_AXIS: 97
OD_OVR_VA: 20/

## 2025-02-19 ASSESSMENT — KERATOMETRY
OD_K2POWER_DIOPTERS: 46.36
OD_AXISANGLE_DEGREES: 018
OS_K2POWER_DIOPTERS: 46.42
OS_K1POWER_DIOPTERS: 45.79
OS_AXISANGLE_DEGREES: 128
OD_K1POWER_DIOPTERS: 45.79

## 2025-02-19 ASSESSMENT — REFRACTION_MANIFEST
OS_SPHERE: +2.00
OD_VA1: 20/NI
OS_CYLINDER: -0.50
OD_SPHERE: +1.00
OD_AXIS: 105
OS_AXIS: 78
OD_CYLINDER: -1.75
OS_VA1: 20/NI

## 2025-02-19 ASSESSMENT — SUPERFICIAL PUNCTATE KERATITIS (SPK): OD_SPK: 1+

## 2025-02-19 ASSESSMENT — DRY EYES - PHYSICIAN NOTES: OD_GENERALCOMMENTS: CENTRALLY

## 2025-03-27 ENCOUNTER — OFFICE (OUTPATIENT)
Dept: URBAN - METROPOLITAN AREA CLINIC 100 | Facility: CLINIC | Age: 75
Setting detail: OPHTHALMOLOGY
End: 2025-03-27
Payer: MEDICARE

## 2025-03-27 DIAGNOSIS — H00.15: ICD-10-CM

## 2025-03-27 PROBLEM — Z41.1 ENCOUNTER FOR COSMETIC SURGERY: Status: ACTIVE | Noted: 2025-03-27

## 2025-03-27 PROBLEM — H02.834 DERMATOCHALASIS; RIGHT UPPER LID, LEFT UPPER LID: Status: ACTIVE | Noted: 2025-03-27

## 2025-03-27 PROBLEM — H02.831 DERMATOCHALASIS; RIGHT UPPER LID, LEFT UPPER LID: Status: ACTIVE | Noted: 2025-03-27

## 2025-03-27 PROCEDURE — 92285 EXTERNAL OCULAR PHOTOGRAPHY: CPT | Performed by: OPHTHALMOLOGY

## 2025-03-27 PROCEDURE — 11900 INJECT SKIN LESIONS </W 7: CPT | Mod: E2 | Performed by: OPHTHALMOLOGY

## 2025-03-27 ASSESSMENT — LID POSITION - DERMATOCHALASIS
OD_DERMATOCHALASIS: 2+
OS_DERMATOCHALASIS: 2+

## 2025-03-27 ASSESSMENT — SUPERFICIAL PUNCTATE KERATITIS (SPK): OD_SPK: 1+

## 2025-03-27 ASSESSMENT — DRY EYES - PHYSICIAN NOTES: OD_GENERALCOMMENTS: CENTRALLY

## 2025-03-27 ASSESSMENT — CONFRONTATIONAL VISUAL FIELD TEST (CVF)
OS_FINDINGS: FULL
OD_FINDINGS: FULL

## 2025-03-28 ASSESSMENT — REFRACTION_AUTOREFRACTION
OD_AXIS: 102
OS_AXIS: 070
OS_SPHERE: +0.75
OS_CYLINDER: -0.75
OD_SPHERE: +1.00
OD_CYLINDER: -1.50

## 2025-03-28 ASSESSMENT — KERATOMETRY
OD_AXISANGLE_DEGREES: 018
OD_K1POWER_DIOPTERS: 45.79
OS_K2POWER_DIOPTERS: 46.42
OS_AXISANGLE_DEGREES: 128
OD_K2POWER_DIOPTERS: 46.36
OS_K1POWER_DIOPTERS: 45.79

## 2025-03-28 ASSESSMENT — VISUAL ACUITY
OS_BCVA: 20/30-2
OD_BCVA: 20/20

## 2025-05-01 ENCOUNTER — OFFICE (OUTPATIENT)
Dept: URBAN - METROPOLITAN AREA CLINIC 100 | Facility: CLINIC | Age: 75
Setting detail: OPHTHALMOLOGY
End: 2025-05-01
Payer: MEDICARE

## 2025-05-01 DIAGNOSIS — Z41.1: ICD-10-CM

## 2025-05-01 DIAGNOSIS — H00.15: ICD-10-CM

## 2025-05-01 PROCEDURE — 92012 INTRM OPH EXAM EST PATIENT: CPT | Performed by: OPHTHALMOLOGY

## 2025-05-01 ASSESSMENT — CONFRONTATIONAL VISUAL FIELD TEST (CVF)
OD_FINDINGS: FULL
OS_FINDINGS: FULL

## 2025-05-01 ASSESSMENT — LID POSITION - DERMATOCHALASIS
OS_DERMATOCHALASIS: 2+
OD_DERMATOCHALASIS: 2+

## 2025-05-01 ASSESSMENT — DRY EYES - PHYSICIAN NOTES: OD_GENERALCOMMENTS: CENTRALLY

## 2025-05-01 ASSESSMENT — SUPERFICIAL PUNCTATE KERATITIS (SPK): OD_SPK: 1+

## 2025-05-01 ASSESSMENT — VISUAL ACUITY
OS_BCVA: 20/25-2
OD_BCVA: 20/20

## 2025-05-29 ENCOUNTER — OFFICE (OUTPATIENT)
Dept: URBAN - METROPOLITAN AREA CLINIC 100 | Facility: CLINIC | Age: 75
Setting detail: OPHTHALMOLOGY
End: 2025-05-29
Payer: MEDICARE

## 2025-05-29 DIAGNOSIS — Z41.1: ICD-10-CM

## 2025-05-29 DIAGNOSIS — B88.0: ICD-10-CM

## 2025-05-29 DIAGNOSIS — H00.15: ICD-10-CM

## 2025-05-29 PROCEDURE — 92014 COMPRE OPH EXAM EST PT 1/>: CPT | Performed by: OPHTHALMOLOGY

## 2025-05-29 PROCEDURE — 92285 EXTERNAL OCULAR PHOTOGRAPHY: CPT | Performed by: OPHTHALMOLOGY

## 2025-05-29 ASSESSMENT — CONFRONTATIONAL VISUAL FIELD TEST (CVF)
OD_FINDINGS: FULL
OS_FINDINGS: FULL

## 2025-05-29 ASSESSMENT — VISUAL ACUITY
OS_BCVA: 20/25-2
OD_BCVA: 20/20

## 2025-05-29 ASSESSMENT — LID POSITION - DERMATOCHALASIS
OS_DERMATOCHALASIS: 2+
OD_DERMATOCHALASIS: 2+

## 2025-05-29 ASSESSMENT — SUPERFICIAL PUNCTATE KERATITIS (SPK): OD_SPK: 1+

## 2025-05-29 ASSESSMENT — DRY EYES - PHYSICIAN NOTES: OD_GENERALCOMMENTS: CENTRALLY
